# Patient Record
Sex: MALE | Race: NATIVE HAWAIIAN OR OTHER PACIFIC ISLANDER | NOT HISPANIC OR LATINO | URBAN - METROPOLITAN AREA
[De-identification: names, ages, dates, MRNs, and addresses within clinical notes are randomized per-mention and may not be internally consistent; named-entity substitution may affect disease eponyms.]

---

## 2024-02-15 ENCOUNTER — EMERGENCY (EMERGENCY)
Facility: HOSPITAL | Age: 29
LOS: 1 days | Discharge: HOME | End: 2024-02-15
Attending: EMERGENCY MEDICINE | Admitting: EMERGENCY MEDICINE
Payer: COMMERCIAL

## 2024-02-15 VITALS
DIASTOLIC BLOOD PRESSURE: 89 MMHG | SYSTOLIC BLOOD PRESSURE: 145 MMHG | HEIGHT: 76 IN | TEMPERATURE: 97 F | RESPIRATION RATE: 20 BRPM | OXYGEN SATURATION: 98 % | HEART RATE: 67 BPM | WEIGHT: 179.9 LBS

## 2024-02-15 VITALS
OXYGEN SATURATION: 98 % | DIASTOLIC BLOOD PRESSURE: 71 MMHG | SYSTOLIC BLOOD PRESSURE: 137 MMHG | TEMPERATURE: 98 F | RESPIRATION RATE: 18 BRPM | HEART RATE: 70 BPM

## 2024-02-15 DIAGNOSIS — N50.811 RIGHT TESTICULAR PAIN: ICD-10-CM

## 2024-02-15 LAB
APPEARANCE UR: CLEAR — SIGNIFICANT CHANGE UP
BACTERIA # UR AUTO: NEGATIVE /HPF — SIGNIFICANT CHANGE UP
BILIRUB UR-MCNC: NEGATIVE — SIGNIFICANT CHANGE UP
CAST: 0 /LPF — SIGNIFICANT CHANGE UP (ref 0–4)
COLOR SPEC: YELLOW — SIGNIFICANT CHANGE UP
DIFF PNL FLD: ABNORMAL
GLUCOSE UR QL: NEGATIVE MG/DL — SIGNIFICANT CHANGE UP
KETONES UR-MCNC: NEGATIVE MG/DL — SIGNIFICANT CHANGE UP
LEUKOCYTE ESTERASE UR-ACNC: NEGATIVE — SIGNIFICANT CHANGE UP
NITRITE UR-MCNC: NEGATIVE — SIGNIFICANT CHANGE UP
PH UR: 6.5 — SIGNIFICANT CHANGE UP (ref 5–8)
PROT UR-MCNC: NEGATIVE MG/DL — SIGNIFICANT CHANGE UP
RBC CASTS # UR COMP ASSIST: 1 /HPF — SIGNIFICANT CHANGE UP (ref 0–4)
SP GR SPEC: 1.01 — SIGNIFICANT CHANGE UP (ref 1–1.03)
SQUAMOUS # UR AUTO: 0 /HPF — SIGNIFICANT CHANGE UP (ref 0–5)
UROBILINOGEN FLD QL: 0.2 MG/DL — SIGNIFICANT CHANGE UP (ref 0.2–1)
WBC UR QL: 0 /HPF — SIGNIFICANT CHANGE UP (ref 0–5)

## 2024-02-15 PROCEDURE — 99284 EMERGENCY DEPT VISIT MOD MDM: CPT

## 2024-02-15 PROCEDURE — 99284 EMERGENCY DEPT VISIT MOD MDM: CPT | Mod: 25

## 2024-02-15 PROCEDURE — 76870 US EXAM SCROTUM: CPT

## 2024-02-15 PROCEDURE — 76870 US EXAM SCROTUM: CPT | Mod: 26

## 2024-02-15 PROCEDURE — 81001 URINALYSIS AUTO W/SCOPE: CPT

## 2024-02-15 PROCEDURE — 87086 URINE CULTURE/COLONY COUNT: CPT

## 2024-02-15 NOTE — ED ADULT NURSE NOTE - NSFALLUNIVINTERV_ED_ALL_ED
Bed/Stretcher in lowest position, wheels locked, appropriate side rails in place/Call bell, personal items and telephone in reach/Instruct patient to call for assistance before getting out of bed/chair/stretcher/Non-slip footwear applied when patient is off stretcher/[place] to call system/Physically safe environment - no spills, clutter or unnecessary equipment/Purposeful proactive rounding/Room/bathroom lighting operational, light cord in reach

## 2024-02-15 NOTE — ED PROVIDER NOTE - AGGRAVATING FACTORS
Result discussed with Valentine Gaviria. She notified me that patient is being treated with Doxycycline 100 mg BID for 10 days. He was [facility]. Advised to have a follow up Xray in 6 weeks. none

## 2024-02-15 NOTE — ED ADULT NURSE NOTE - OBJECTIVE STATEMENT
28yoM came to ED c/o testicular pain. Pt states the pain started in his R testicle yesterday. Pt went to  and they referred him here to rule out torsion. Pt deneis any fever, discharge, and urinary symptoms. Pt denies pain at the moment.

## 2024-02-15 NOTE — ED PROVIDER NOTE - PATIENT PORTAL LINK FT
You can access the FollowMyHealth Patient Portal offered by [facility] by registering at the following website: [email or web address]. By joining [facility]’s FollowMyHealth portal, you will also be able to view your health information using other applications (apps) compatible with our system.

## 2024-02-15 NOTE — ED ADULT NURSE NOTE - CHIEF COMPLAINT QUOTE
PT presents to ED here for R testicular pain x 2 days intermittent. Pt was seen at [facility] and was sent here to r/o torsion. Pt denies any urinary sx or discharge.

## 2024-02-15 NOTE — ED ADULT TRIAGE NOTE - CHIEF COMPLAINT QUOTE
PT presents to ED here for R testicular pain x 2 days intermittent. Pt was seen at [facility] and was sent here to r/o torsion. Pt denies any urinary sx or discharge.

## 2024-02-15 NOTE — ED PROVIDER NOTE - PROGRESS NOTE DETAILS
[place]: UA w/ trace blood, no infection. US shwoed "No acute pathology. Left varicocele is incidentally noted"  Pt remains very well appearing. Discussed importance of outpt follow up and return precautions. Clinically no indication for further emergent ED workup or hospitalization at this time. Stable for dc, outpt f/u.

## 2024-02-15 NOTE — ED PROVIDER NOTE - NSFOLLOWUPINSTRUCTIONS_ED_ALL_ED_FT
Follow up with urologist for your "varicocele." (that was found on the left testicle).     Can take tylenol every 6hrs as needed for pain.    Can also take motrin 600mg every 6hrs as needed for pain (WARNING: Use may cause stomach issues/problems. Take with food. Prolonged use can also cause kidney issues.).     Return for fevers, spreading redness, worsening swelling, persistent vomit, uncontrolled pain, worsening breathing, worsening lightheaded, unable to urinate.    Follow up with primary doctor within 1-2 days.     Follow up with urologist. Can call [phone] to schedule appointment. Can also follow up at [facility] urology clinic. Can call [phone] to schedule appointment.     Varicocele    WHAT YOU NEED TO KNOW:    A varicocele is a condition that causes the veins in your scrotum to become enlarged and dilated. The scrotum is the sac that holds the testicles. A varicocele can cause infertility because it prevents sperm from flowing out of the testicles.    DISCHARGE INSTRUCTIONS:    Medicines:   •NSAIDs, such as ibuprofen, help decrease swelling, pain, and fever. This medicine is available with or without a doctor's order. NSAIDs can cause stomach bleeding or kidney problems in certain people. If you take blood thinner medicine, always ask your healthcare provider if NSAIDs are safe for you. Always read the medicine label and follow directions.    •Take your medicine as directed. Contact your healthcare provider if you think your medicine is not helping or if you have side effects. Tell him or her if you are allergic to any medicine. Keep a list of the medicines, vitamins, and herbs you take. Include the amounts, and when and why you take them. Bring the list or the pill bottles to follow-up visits. Carry your medicine list with you in case of an emergency.    Wear an athletic supporter: An athletic supporter may reduce pressure and treat your varicocele.    Follow up with your healthcare provider as directed: Write down your questions so you remember to ask them during your visits.     Contact your healthcare provider if:   •You have pain in your scrotum.  •You have a lump on your scrotum.  •You have questions or concerns about your condition or care. e

## 2024-02-15 NOTE — ED PROVIDER NOTE - OBJECTIVE STATEMENT
28M denies PMH p/w R testicular discomfort, gradual onset yesterday, currently scale 1/10. Went to UC who referred to ED to r/o torsion. No other systemic symptoms.   Last sexually active a few weeks ago. Denies hx STDs. Denies penile pain/discharge, rashes. Cannot recall specific precipitating trauma.   Denies joint pains, f/c, NVD, abd pain, back/rectal pain, urinary complaints.

## 2024-02-15 NOTE — ED PROVIDER NOTE - CLINICAL SUMMARY MEDICAL DECISION MAKING FREE TEXT BOX
28M denies PMH p/w R testicular discomfort, gradual onset yesterday, currently scale 1/10. Went to  who referred to ED to r/o torsion. No other systemic symptoms.   Vitals wnl, exam as above.   ddx: Very low suspicion for significant testicular pathology.   US, reassess.

## 2024-02-17 LAB
CULTURE RESULTS: NO GROWTH — SIGNIFICANT CHANGE UP
SPECIMEN SOURCE: SIGNIFICANT CHANGE UP